# Patient Record
(demographics unavailable — no encounter records)

---

## 2025-01-11 NOTE — CONSULT LETTER
[Dear  ___] : Dear  [unfilled], [Consult Letter:] : I had the pleasure of evaluating your patient, [unfilled]. [Please see my note below.] : Please see my note below. [Consult Closing:] : Thank you very much for allowing me to participate in the care of this patient.  If you have any questions, please do not hesitate to contact me. [Sincerely,] : Sincerely, [DrGary  ___] : Dr. HALLMAN [FreeTextEntry3] : Shy Duncan MD, FACS Assistant Professor of Surgery and Otolaryngology [facility]

## 2025-01-11 NOTE — REASON FOR VISIT
[Initial Consultation] : an initial consultation for [Family Member] : family member [FreeTextEntry2] : Atypical thyroid nodule

## 2025-01-11 NOTE — PHYSICAL EXAM
[Normal] : no neck adenopathy [de-identified] : no cervical or supraclavicular adenopathy, trachea midline, thyroid without enlargement or palpable mass [de-identified] : Skin:  normal appearance.  no rash, nodules, vesicles, or erythema, Musculoskeletal:  full range of motion and no deformities appreciated Neurological:  grossly intact Psychiatric:  oriented to person, place and time with appropriate affect I have personally seen and examined this patient.  I have fully participated in the care of this patient. I have reviewed all pertinent clinical information, including history, physical exam, plan and the Resident’s note and agree except as noted.

## 2025-01-11 NOTE — CONSULT LETTER
[Dear  ___] : Dear  [unfilled], [Consult Letter:] : I had the pleasure of evaluating your patient, [unfilled]. [Please see my note below.] : Please see my note below. [Consult Closing:] : Thank you very much for allowing me to participate in the care of this patient.  If you have any questions, please do not hesitate to contact me. [Sincerely,] : Sincerely, [DrGary  ___] : Dr. HALLMAN [FreeTextEntry3] : Shy Duncan MD, FACS Assistant Professor of Surgery and Otolaryngology [facility]

## 2025-01-11 NOTE — HISTORY OF PRESENT ILLNESS
[de-identified] : Patient referred by Dr. Gunn for evaluation of atypical right thyroid nodule.  Patient was followed by Dr. Clemente for several decades for follow-up of thyroid nodules and hypothyroidism.  Patient reports remote biopsy benign.  Patient transferred care to Dr. Gunn with thyroid ultrasound July 2024: Right lobe 4.9 x 1.7 x 1.5 cm with upper 1.4 x 1.3 x 0.9 cm nodule and mid 9 x 8 x 10 mm nodule.  Left lobe 3.7 x 1.5 x 1.6 cm.  No nodules noted.  Prior ultrasound February 2012: Right lobe 5.1 x 1.8 x 2.2 cm with upper nodule 1.6 x 1.6 x 1.9 cm and mid 1.3 x 1.2 x 1 cm nodules.  No left nodules noted.  Biopsy right upper nodule November 2024: AUS, ThyroSeq canceled due to insufficient quantity.  Repeat biopsy December 2024: Sclerotic nodule with fibrous scar tissue few thyroid cells and colloid.  Could represent hyalinized sclerotic nodule or possible more worrisome cytology.  Blood work September 2024: TSH 5.2, free T4 1.2. I have reviewed all old and new data and available images.  Additional information was obtained from others present at the time of the visit to ensure the completeness of the history.

## 2025-01-11 NOTE — PHYSICAL EXAM
[Normal] : no neck adenopathy [de-identified] : no cervical or supraclavicular adenopathy, trachea midline, thyroid without enlargement or palpable mass [de-identified] : Skin:  normal appearance.  no rash, nodules, vesicles, or erythema, Musculoskeletal:  full range of motion and no deformities appreciated Neurological:  grossly intact Psychiatric:  oriented to person, place and time with appropriate affect

## 2025-01-11 NOTE — HISTORY OF PRESENT ILLNESS
[de-identified] : Patient referred by Dr. Gunn for evaluation of atypical right thyroid nodule.  Patient was followed by Dr. Clemente for several decades for follow-up of thyroid nodules and hypothyroidism.  Patient reports remote biopsy benign.  Patient transferred care to Dr. Gunn with thyroid ultrasound July 2024: Right lobe 4.9 x 1.7 x 1.5 cm with upper 1.4 x 1.3 x 0.9 cm nodule and mid 9 x 8 x 10 mm nodule.  Left lobe 3.7 x 1.5 x 1.6 cm.  No nodules noted.  Prior ultrasound February 2012: Right lobe 5.1 x 1.8 x 2.2 cm with upper nodule 1.6 x 1.6 x 1.9 cm and mid 1.3 x 1.2 x 1 cm nodules.  No left nodules noted.  Biopsy right upper nodule November 2024: AUS, ThyroSeq canceled due to insufficient quantity.  Repeat biopsy December 2024: Sclerotic nodule with fibrous scar tissue few thyroid cells and colloid.  Could represent hyalinized sclerotic nodule or possible more worrisome cytology.  Blood work September 2024: TSH 5.2, free T4 1.2. I have reviewed all old and new data and available images.  Additional information was obtained from others present at the time of the visit to ensure the completeness of the history.

## 2025-01-11 NOTE — ASSESSMENT
[FreeTextEntry1] : Patient with long history of hypothyroidism and thyroid nodules with recent atypical thyroid biopsy.  Comparing to prior studies, thyroid nodules have not increased in size in over 13 years.  I have discussed surgical excision for definitive diagnosis versus continued observation.  The patient would like to avoid surgery at this time.  Quested a repeat ultrasound June 2025, RTO 6 months.  If nodule enlarges, a repeat biopsy will be performed at that time.  I have answered her questions to the best my ability.

## 2025-03-14 NOTE — HISTORY OF PRESENT ILLNESS
[FreeTextEntry1] : Ms. Maciel is presenting for hypothyroidism and osteoporosis.   61 year old female with PMH of vWD, sleep apnea, osteoporosis, epilepsy, hair loss.   #Hypothyroidism  Diagnosed ~20 years ago  Sept 2024 TSH 5.2, FT4 1.2 on LT4 112mcg QD   #Osteoporosis  Fracture history: No Family history: No parental history of hip fracture Prior Treatment: No  Prior HRT: None   Bone History Menopause: ~age 50  [facility] Feb 2025 DEXA scan: T-scores of -3.1 at the lumbar spine, -1.9 at the R femoral neck, -1.8 at the R femoral neck  Falls: No Height loss: No  Kidney stones: No Dental health: Not seen dentist in 1 year. No history of implants, no upcoming procedures planned Exercise: Gym, brisk walk treadmill.  Dairy intake: milk, cottage cheese.  Calcium supplements:  Multivitamin: Yes  Vitamin D supplements: 2,000IU daily   Osteoporosis risk factors include: Postmenopausal status,  race, prior fracture, falls, height loss, small thin bones, tobacco use, malignancy, radiation treatment, excessive alcohol, anorexia, family history, vitamin D deficiency, long term corticosteroid use (>3 months), seizure medications (ie phenytoin), prolonged amenorrhea, eating disorders, malabsorption, hyperparathyroidism, hyperthyroidism. NEGATIVE EXCEPT: Postmenopausal status,  race,

## 2025-03-14 NOTE — PHYSICAL EXAM
[Alert] : alert [Well Nourished] : well nourished [EOMI] : extra ocular movement intact [Normal Hearing] : hearing was normal [No Respiratory Distress] : no respiratory distress [No Accessory Muscle Use] : no accessory muscle use [Normal Rate] : heart rate was normal [Not Distended] : not distended [Kyphosis] : kyphosis present [Normal Gait] : normal gait [No Tremors] : no tremors [Oriented x3] : oriented to person, place, and time [Normal Affect] : the affect was normal [Normal Mood] : the mood was normal [Scoliosis] : no scoliosis [de-identified] : +nodular gland

## 2025-03-14 NOTE — ASSESSMENT
[Denosumab Therapy] : Risks  and benefits of denosumab therapy were discussed with the patient including eczema, cellulitis, osteonecrosis of the jaw and atypical femur fractures [Bisphosphonate Therapy] : Risks and benefits of bisphosphonate therapy were  discussed with the patient including gastroesophageal irritation, osteonecrosis of the jaw, and atypical femur fractures, and acute phase reaction [Teriparatide/Abaloparatide Therapy] : Risks and benefits of Teriparatide/Abaloparatide therapy were discussed with the patient including potential risk of osteosarcoma [FreeTextEntry1] : 61 year old female with PMH of vWD, sleep apnea, osteoporosis, epilepsy, hair loss is presenting for hypothyroidism and osteoporosis.   1. Osteoporosis.  She has no history of fragility fracture.  Her bone density shows T-scores of -3.1 at the lumbar spine, -1.9 at the R femoral neck, -1.8 at the R femoral neck  -We discussed the potential benefits and risks of the osteoanabolic and antiresorptive classes of pharmacologic osteoporosis therapy. The risks and benefits of osteoanabolic therapy, including but not limited to hypercalcemia, nausea, orthostatic hypotension, osteosarcoma. If history of radiation therapy, teriparatide and abaloparatide are contraindicated, however, we can consider romosozumab therapy. We discussed the potential benefits and risks of romosozumab at length, including but not limited to osteonecrosis of the jaw, atypical femoral fracture, cardiovascular risk including heart attack and stroke. We also discussed the potential benefits and risks of antiresorptive osteoporosis therapy with denosumab or zoledronic acid at length, including but not limited to osteonecrosis of the jaw and atypical femoral fracture. We discussed that denosumab must be dosed every 6 months due to rebound increase in bone breakdown with abrupt discontinuation of therapy, with transition to bisphosphonate therapy prior to a "drug holiday."    I still recommend therapy with anabolics > Reclast > Evenity.  She will consider her options pending evaluation below. Metabolic evaluation for secondary causes of osteoporosis Calcium 1000 mg daily from diet and supplements (to be taken in divided doses as no more than 500-600 mg can be absorbed at one time); advised increased dietary and/or supplemental calcium Can continue taking vitamin D supplementation up to 2000 intl units daily Diet, weight bearing exercises and fall prevention discussed  2.Hypothyroidism  C/w current dose of Levothyroxine 112mg QD  Repeat TFTs now   Patient verbalized understanding of the above. All questions were answered to patient's satisfaction. Dispo: Patient will follow up in 3 weeks TEB to discuss above results.

## 2025-03-14 NOTE — REASON FOR VISIT
[Initial Evaluation] : an initial evaluation [Hypothyroidism] : hypothyroidism [Osteoporosis] : osteoporosis [Thyroid nodule/ MNG] : thyroid nodule/ MNG [Family Member] : family member

## 2025-03-27 NOTE — ASSESSMENT
[Denosumab Therapy] : Risks  and benefits of denosumab therapy were discussed with the patient including eczema, cellulitis, osteonecrosis of the jaw and atypical femur fractures [Bisphosphonate Therapy] : Risks and benefits of bisphosphonate therapy were  discussed with the patient including gastroesophageal irritation, osteonecrosis of the jaw, and atypical femur fractures, and acute phase reaction [Teriparatide/Abaloparatide Therapy] : Risks and benefits of Teriparatide/Abaloparatide therapy were discussed with the patient including potential risk of osteosarcoma [FreeTextEntry1] : 61 year old female with PMH of vWD, sleep apnea, osteoporosis, epilepsy, hair loss is presenting for hypothyroidism and osteoporosis.   1. Osteoporosis.  She has no history of fragility fracture.  Her bone density shows T-scores of -3.1 at the lumbar spine, -1.9 at the R femoral neck, -1.8 at the R femoral neck  -We discussed the potential benefits and risks of the osteoanabolic and antiresorptive classes of pharmacologic osteoporosis therapy. The risks and benefits of osteoanabolic therapy, including but not limited to hypercalcemia, nausea, orthostatic hypotension, osteosarcoma. If history of radiation therapy, teriparatide and abaloparatide are contraindicated, however, we can consider romosozumab therapy. We discussed the potential benefits and risks of romosozumab at length, including but not limited to osteonecrosis of the jaw, atypical femoral fracture, cardiovascular risk including heart attack and stroke. We also discussed the potential benefits and risks of antiresorptive osteoporosis therapy with denosumab or zoledronic acid at length, including but not limited to osteonecrosis of the jaw and atypical femoral fracture. We discussed that denosumab must be dosed every 6 months due to rebound increase in bone breakdown with abrupt discontinuation of therapy, with transition to bisphosphonate therapy prior to a "drug holiday."    I still recommend therapy with anabolics > Reclast > Evenity.  She will complete her dentist appt, and see rheum (for mildly positive ODETTE titer 1:40) and then re-discuss with me her treatment choice. She is slightly inclined to oral BP > others.   Calcium 1000 mg daily from diet and supplements (to be taken in divided doses as no more than 500-600 mg can be absorbed at one time); advised increased dietary and/or supplemental calcium Can continue taking vitamin D supplementation up to 2000 intl units daily Diet, weight bearing exercises and fall prevention discussed  2.Hypothyroidism  Reduce from 2 tabs 112mcg QD to 2 tabs 112mg QD Mon thru friday, 1 tab 112mcg sat and sun  Repeat TFTs in 6 weeks.    Patient verbalized understanding of the above. All questions were answered to patient's satisfaction. Dispo: Patient will follow up when she makes her decision.

## 2025-03-27 NOTE — HISTORY OF PRESENT ILLNESS
[FreeTextEntry1] : Ms. Maciel is presenting for hypothyroidism and osteoporosis.   61 year old female with PMH of vWD, sleep apnea, osteoporosis, epilepsy, hair loss.   #Hypothyroidism  Diagnosed ~20 years ago  Sept 2024 TSH 5.2, FT4 1.2 on 2 tabs LT4 112mcg QD   #Osteoporosis  Fracture history: No Family history: No parental history of hip fracture Prior Treatment: No  Prior HRT: None   Bone History Menopause: ~age 50  [facility] Feb 2025 DEXA scan: T-scores of -3.1 at the lumbar spine, -1.9 at the R femoral neck, -1.8 at the R femoral neck  Falls: No Height loss: No  Kidney stones: No Dental health: Not seen dentist in 1 year. No history of implants, no upcoming procedures planned Exercise: Gym, brisk walk treadmill.  Dairy intake: milk, cottage cheese.  Calcium supplements:  Multivitamin: Yes  Vitamin D supplements: 2,000IU daily   Osteoporosis risk factors include: Postmenopausal status,  race, prior fracture, falls, height loss, small thin bones, tobacco use, malignancy, radiation treatment, excessive alcohol, anorexia, family history, vitamin D deficiency, long term corticosteroid use (>3 months), seizure medications (ie phenytoin), prolonged amenorrhea, eating disorders, malabsorption, hyperparathyroidism, hyperthyroidism. NEGATIVE EXCEPT: Postmenopausal status,  race.   Interval hx:  Patient informed me she is taking LT4 112mcg TWO tablets per day.  [facility] March 2025 TSH showed 0.19, FT4 1.6 Patient has not seen a dentist yet.  Labs discussed with patient.

## 2025-03-27 NOTE — REASON FOR VISIT
[Spouse] : spouse [Home] : at home, [unfilled] , at the time of the visit. [Other Location: e.g. Home (Enter Location, City,State)___] : at [unfilled] [Telehealth (audio & video)] : This visit was provided via telehealth using real-time 2-way audio visual technology. [Verbal consent obtained from patient] : the patient, [unfilled] [Follow - Up] : a follow-up visit [Hypothyroidism] : hypothyroidism [Osteoporosis] : osteoporosis [Thyroid nodule/ MNG] : thyroid nodule/ MNG [Family Member] : family member

## 2025-05-15 NOTE — REASON FOR VISIT
[Follow - Up] : a follow-up visit [Hypothyroidism] : hypothyroidism [Osteoporosis] : osteoporosis [Thyroid nodule/ MNG] : thyroid nodule/ MNG [Family Member] : family member [Spouse] : spouse [Home] : at home, [unfilled] , at the time of the visit. [Medical Office: ([facility])___] : at the medical office located in  [Telehealth (audio & video)] : This visit was provided via telehealth using real-time 2-way audio visual technology. [Verbal consent obtained from patient] : the patient, [unfilled]

## 2025-05-15 NOTE — ASSESSMENT
[Denosumab Therapy] : Risks  and benefits of denosumab therapy were discussed with the patient including eczema, cellulitis, osteonecrosis of the jaw and atypical femur fractures [Bisphosphonate Therapy] : Risks and benefits of bisphosphonate therapy were  discussed with the patient including gastroesophageal irritation, osteonecrosis of the jaw, and atypical femur fractures, and acute phase reaction [Teriparatide/Abaloparatide Therapy] : Risks and benefits of Teriparatide/Abaloparatide therapy were discussed with the patient including potential risk of osteosarcoma [FreeTextEntry1] : 61 year old female with PMH of vWD, sleep apnea, osteoporosis, epilepsy, hair loss is presenting for hypothyroidism and osteoporosis.   1. Osteoporosis.  She has no history of fragility fracture.  Her bone density shows T-scores of -3.1 at the lumbar spine, -1.9 at the R femoral neck, -1.8 at the R femoral neck  -We discussed during the PREVIOUS VISIT, the potential benefits and risks of the osteoanabolic and antiresorptive classes of pharmacologic osteoporosis therapy. The risks and benefits of osteoanabolic therapy, including but not limited to hypercalcemia, nausea, orthostatic hypotension, osteosarcoma. If history of radiation therapy, teriparatide and abaloparatide are contraindicated, however, we can consider romosozumab therapy. We discussed the potential benefits and risks of romosozumab at length, including but not limited to osteonecrosis of the jaw, atypical femoral fracture, cardiovascular risk including heart attack and stroke. We also discussed the potential benefits and risks of antiresorptive osteoporosis therapy with denosumab or zoledronic acid at length, including but not limited to osteonecrosis of the jaw and atypical femoral fracture. We discussed that denosumab must be dosed every 6 months due to rebound increase in bone breakdown with abrupt discontinuation of therapy, with transition to bisphosphonate therapy prior to a "drug holiday."    I still recommend therapy with anabolics > Reclast > Evenity.  She completed her dentist appt and saw rheum (for mildly positive ODETTE titer 1:40). She will see a second doctor for a second opinion regarding OP tx and then re-discuss with me her treatment choice if she wishes. Calcium 1000 mg daily from diet and supplements (to be taken in divided doses as no more than 500-600 mg can be absorbed at one time); advised increased dietary and/or supplemental calcium Can continue taking vitamin D supplementation up to 2000 intl units daily Diet, weight bearing exercises and fall prevention discussed  2.Hypothyroidism  May 2025 TSH 0.57 Continue 2 tabs 112mg QD Mon thru friday, 1 tab 112mcg sat and sun   Patient verbalized understanding of the above. All questions were answered to patient's satisfaction. Dispo: Patient will follow up in 6 - 12 months.

## 2025-05-15 NOTE — HISTORY OF PRESENT ILLNESS
[FreeTextEntry1] : Ms. Maciel is presenting for hypothyroidism and osteoporosis.   61 year old female with PMH of vWD, sleep apnea, osteoporosis, epilepsy, hair loss.   #Hypothyroidism  Diagnosed ~20 years ago  Sept 2024 TSH 5.2, FT4 1.2 on 2 tabs LT4 112mcg QD   #Osteoporosis  Fracture history: No Family history: No parental history of hip fracture Prior Treatment: No  Prior HRT: None   Bone History Menopause: ~age 50  [facility] Feb 2025 DEXA scan: T-scores of -3.1 at the lumbar spine, -1.9 at the R femoral neck, -1.8 at the R femoral neck  Falls: No Height loss: No  Kidney stones: No Dental health: Not seen dentist in 1 year. No history of implants, no upcoming procedures planned Exercise: Gym, brisk walk treadmill.  Dairy intake: milk, cottage cheese.  Calcium supplements:  Multivitamin: Yes  Vitamin D supplements: 2,000IU daily   Osteoporosis risk factors include: Postmenopausal status,  race, prior fracture, falls, height loss, small thin bones, tobacco use, malignancy, radiation treatment, excessive alcohol, anorexia, family history, vitamin D deficiency, long term corticosteroid use (>3 months), seizure medications (ie phenytoin), prolonged amenorrhea, eating disorders, malabsorption, hyperparathyroidism, hyperthyroidism. NEGATIVE EXCEPT: Postmenopausal status,  race.   Interval hx:  [facility] May 2025 TSH 0.57   Patient was seen by her dentist. Wnl.  Pt wants to obtain a second opinion regarding OP treatment. She is concerned for side effects.  Saw Rheum for +ODETTE ([facility]), was not diagnosed with another AI disease.  Labs from [facility] from March discussed with patient.

## 2025-06-26 NOTE — ASSESSMENT
[FreeTextEntry1] : Patient with long history of hypothyroidism and thyroid nodules with recent atypical thyroid biopsy.  Comparing to prior studies, thyroid nodules have not increased in size in over 13 years.  I have discussed surgical excision for definitive diagnosis versus continued observation.  The patient would like to avoid surgery at this time. stable on repeat ultrasound June 2025, continue to monitor with US 11/2025, RTO 6 months.  If nodule enlarges, a repeat biopsy will be performed at that time.  I have answered her questions to the best my ability.

## 2025-06-26 NOTE — HISTORY OF PRESENT ILLNESS
[de-identified] : Patient referred by Dr. Gunn for evaluation of atypical right thyroid nodule.  Patient was followed by Dr. Clemente for several decades for follow-up of thyroid nodules and hypothyroidism.  Patient reports remote biopsy benign.  Patient transferred care to Dr. Gunn with thyroid ultrasound July 2024: Right lobe 4.9 x 1.7 x 1.5 cm with upper 1.4 x 1.3 x 0.9 cm nodule and mid 9 x 8 x 10 mm nodule.  Left lobe 3.7 x 1.5 x 1.6 cm.  No nodules noted.  Prior ultrasound February 2012: Right lobe 5.1 x 1.8 x 2.2 cm with upper nodule 1.6 x 1.6 x 1.9 cm and mid 1.3 x 1.2 x 1 cm nodules.  No left nodules noted.  Biopsy right upper nodule November 2024: AUS, ThyroSeq canceled due to insufficient quantity.  Repeat biopsy December 2024: Sclerotic nodule with fibrous scar tissue few thyroid cells and colloid.  Could represent hyalinized sclerotic nodule or possible more worrisome cytology.  Blood work September 2024: TSH 5.2, free T4 1.2.  Repeat ultrasound June 2025 without change in thyroid nodules, slightly smaller.  I have reviewed all old and new data and available images.  Additional information was obtained from others present at the time of the visit to ensure the completeness of the history.

## 2025-06-26 NOTE — PHYSICAL EXAM
[de-identified] : no cervical or supraclavicular adenopathy, trachea midline, thyroid without enlargement or palpable mass [Normal] : no neck adenopathy [de-identified] : Skin:  normal appearance.  no rash, nodules, vesicles, or erythema, Musculoskeletal:  full range of motion and no deformities appreciated Neurological:  grossly intact Psychiatric:  oriented to person, place and time with appropriate affect